# Patient Record
Sex: FEMALE | Race: BLACK OR AFRICAN AMERICAN | Employment: PART TIME | URBAN - METROPOLITAN AREA
[De-identification: names, ages, dates, MRNs, and addresses within clinical notes are randomized per-mention and may not be internally consistent; named-entity substitution may affect disease eponyms.]

---

## 2022-02-28 ENCOUNTER — HOSPITAL ENCOUNTER (OUTPATIENT)
Dept: ULTRASOUND IMAGING | Age: 22
Discharge: HOME | End: 2022-03-02
Payer: COMMERCIAL

## 2022-02-28 DIAGNOSIS — N93.8 DYSFUNCTIONAL UTERINE BLEEDING: ICD-10-CM

## 2022-02-28 PROCEDURE — 76856 US EXAM PELVIC COMPLETE: CPT

## 2022-08-09 ENCOUNTER — OFFICE VISIT (OUTPATIENT)
Dept: FAMILY MEDICINE CLINIC | Age: 22
End: 2022-08-09
Payer: COMMERCIAL

## 2022-08-09 VITALS
WEIGHT: 126.8 LBS | DIASTOLIC BLOOD PRESSURE: 66 MMHG | OXYGEN SATURATION: 100 % | HEIGHT: 69 IN | RESPIRATION RATE: 16 BRPM | BODY MASS INDEX: 18.78 KG/M2 | HEART RATE: 62 BPM | TEMPERATURE: 97.6 F | SYSTOLIC BLOOD PRESSURE: 104 MMHG

## 2022-08-09 DIAGNOSIS — J40 BRONCHITIS: Primary | ICD-10-CM

## 2022-08-09 LAB
Lab: NORMAL
PERFORMING INSTRUMENT: NORMAL
QC PASS/FAIL: NORMAL
SARS-COV-2, POC: NORMAL

## 2022-08-09 PROCEDURE — G8420 CALC BMI NORM PARAMETERS: HCPCS | Performed by: NURSE PRACTITIONER

## 2022-08-09 PROCEDURE — 1036F TOBACCO NON-USER: CPT | Performed by: NURSE PRACTITIONER

## 2022-08-09 PROCEDURE — 99202 OFFICE O/P NEW SF 15 MIN: CPT | Performed by: NURSE PRACTITIONER

## 2022-08-09 PROCEDURE — G8427 DOCREV CUR MEDS BY ELIG CLIN: HCPCS | Performed by: NURSE PRACTITIONER

## 2022-08-09 PROCEDURE — 87426 SARSCOV CORONAVIRUS AG IA: CPT | Performed by: NURSE PRACTITIONER

## 2022-08-09 RX ORDER — DROSPIRENONE AND ETHINYL ESTRADIOL 0.03MG-3MG
KIT ORAL
COMMUNITY
Start: 2022-06-20

## 2022-08-09 RX ORDER — PREDNISONE 20 MG/1
40 TABLET ORAL DAILY
Qty: 10 TABLET | Refills: 0 | Status: SHIPPED | OUTPATIENT
Start: 2022-08-09 | End: 2022-08-14

## 2022-08-09 RX ORDER — BROMPHENIRAMINE MALEATE, PSEUDOEPHEDRINE HYDROCHLORIDE, AND DEXTROMETHORPHAN HYDROBROMIDE 2; 30; 10 MG/5ML; MG/5ML; MG/5ML
5 SYRUP ORAL 4 TIMES DAILY PRN
Qty: 180 ML | Refills: 0 | Status: SHIPPED | OUTPATIENT
Start: 2022-08-09

## 2022-08-09 RX ORDER — AZITHROMYCIN 250 MG/1
TABLET, FILM COATED ORAL
Qty: 6 TABLET | Refills: 0 | Status: SHIPPED | OUTPATIENT
Start: 2022-08-09 | End: 2022-08-19

## 2022-08-09 SDOH — ECONOMIC STABILITY: FOOD INSECURITY: WITHIN THE PAST 12 MONTHS, YOU WORRIED THAT YOUR FOOD WOULD RUN OUT BEFORE YOU GOT MONEY TO BUY MORE.: NEVER TRUE

## 2022-08-09 SDOH — ECONOMIC STABILITY: TRANSPORTATION INSECURITY
IN THE PAST 12 MONTHS, HAS THE LACK OF TRANSPORTATION KEPT YOU FROM MEDICAL APPOINTMENTS OR FROM GETTING MEDICATIONS?: NO

## 2022-08-09 SDOH — ECONOMIC STABILITY: TRANSPORTATION INSECURITY
IN THE PAST 12 MONTHS, HAS LACK OF TRANSPORTATION KEPT YOU FROM MEETINGS, WORK, OR FROM GETTING THINGS NEEDED FOR DAILY LIVING?: NO

## 2022-08-09 SDOH — ECONOMIC STABILITY: FOOD INSECURITY: WITHIN THE PAST 12 MONTHS, THE FOOD YOU BOUGHT JUST DIDN'T LAST AND YOU DIDN'T HAVE MONEY TO GET MORE.: NEVER TRUE

## 2022-08-09 ASSESSMENT — PATIENT HEALTH QUESTIONNAIRE - PHQ9
SUM OF ALL RESPONSES TO PHQ QUESTIONS 1-9: 1
2. FEELING DOWN, DEPRESSED OR HOPELESS: 0
SUM OF ALL RESPONSES TO PHQ9 QUESTIONS 1 & 2: 1
SUM OF ALL RESPONSES TO PHQ QUESTIONS 1-9: 1
1. LITTLE INTEREST OR PLEASURE IN DOING THINGS: 1
SUM OF ALL RESPONSES TO PHQ QUESTIONS 1-9: 1
SUM OF ALL RESPONSES TO PHQ QUESTIONS 1-9: 1

## 2022-08-09 ASSESSMENT — ENCOUNTER SYMPTOMS
NAUSEA: 0
RHINORRHEA: 1
SINUS PRESSURE: 0
HEMOPTYSIS: 0
DIARRHEA: 0
TROUBLE SWALLOWING: 0
WHEEZING: 0
CONSTIPATION: 0
ABDOMINAL DISTENTION: 0
VOMITING: 0
COLOR CHANGE: 0
COUGH: 1
SINUS PAIN: 0
BACK PAIN: 0
CHEST TIGHTNESS: 1
ABDOMINAL PAIN: 0
HEARTBURN: 0
SHORTNESS OF BREATH: 0
SORE THROAT: 0

## 2022-08-09 ASSESSMENT — SOCIAL DETERMINANTS OF HEALTH (SDOH): HOW HARD IS IT FOR YOU TO PAY FOR THE VERY BASICS LIKE FOOD, HOUSING, MEDICAL CARE, AND HEATING?: NOT HARD AT ALL

## 2022-08-09 NOTE — PROGRESS NOTES
[id number] [address] [place]      [address] [facility]       Chief Complaint   Patient presents with    Cough     Congestion, body aches, chills and night sweats, when she coughs her ribs are hurting due to coughing so much. Started Saturday       Nurses Notes reviewed and I agree except as noted in the HPI. HISTORY OF PRESENT ILLNESS   Opal Romero is a 22 y.o. female who presents   Cough  This is a new problem. The current episode started in the past 7 days. The problem has been gradually worsening. The cough is Non-productive. Associated symptoms include chest pain (ribs with coughing), nasal congestion, postnasal drip and rhinorrhea. Pertinent negatives include no chills, ear congestion, ear pain, fever, headaches, heartburn, hemoptysis, myalgias, rash, sore throat, shortness of breath, sweats, weight loss or wheezing. Nothing aggravates the symptoms. She has tried OTC cough suppressant for the symptoms. The treatment provided no relief. Her past medical history is significant for asthma. There is no history of bronchiectasis, bronchitis, COPD, emphysema, environmental allergies or pneumonia. REVIEW OF SYSTEMS     Review of Systems   Constitutional:  Negative for activity change, appetite change, chills, diaphoresis, fatigue, fever and weight loss. HENT:  Positive for postnasal drip and rhinorrhea. Negative for congestion, ear pain, sinus pressure, sinus pain, sore throat and trouble swallowing. Eyes:  Negative for visual disturbance. Respiratory:  Positive for cough and chest tightness. Negative for hemoptysis, shortness of breath and wheezing. Cardiovascular:  Positive for chest pain (ribs with coughing). Negative for palpitations. Gastrointestinal:  Negative for abdominal distention, abdominal pain, constipation, diarrhea, heartburn, nausea and vomiting. Genitourinary:  Negative for difficulty urinating, dysuria, flank pain, frequency and urgency. Left eye: No discharge. Conjunctiva/sclera: Conjunctivae normal.      Pupils: Pupils are equal, round, and reactive to light. Cardiovascular:      Rate and Rhythm: Normal rate and regular rhythm. Pulses: Normal pulses. Pulmonary:      Effort: Pulmonary effort is normal.      Breath sounds: Examination of the right-lower field reveals decreased breath sounds. Examination of the left-lower field reveals decreased breath sounds. Decreased breath sounds present. No wheezing or rhonchi. Comments: Noted faint wheezing with coughing  Musculoskeletal:         General: No tenderness. Normal range of motion. Cervical back: Normal range of motion and neck supple. No rigidity or tenderness. Skin:     General: Skin is warm and dry. Neurological:      General: No focal deficit present. Mental Status: She is alert and oriented to person, place, and time. Mental status is at baseline. Cranial Nerves: No cranial nerve deficit. Sensory: No sensory deficit. Motor: No weakness. Coordination: Coordination normal.       DIAGNOSTICRESULTS   Labs:   Covid - neg    IMAGING:    URGENT CARE COURSE:     Vitals:    08/09/22 1051   BP: 104/66   Site: Right Upper Arm   Position: Sitting   Cuff Size: Medium Adult   Pulse: 62   Resp: 16   Temp: 97.6 °F (36.4 °C)   TempSrc: Temporal   SpO2: 100%   Weight: 126 lb 12.8 oz (57.5 kg)   Height: 5' 9\" (1.753 m)         PROCEDURES:  None  FINAL IMPRESSION      1. Bronchitis        DISPOSITION/PLAN   DISPOSITION      PATIENT REFERRED TO:  Return in about 1 week (around 8/16/2022), or if symptoms worsen or fail to improve, for follow up with PCP.   DISCHARGE MEDICATIONS:  New Prescriptions    AZITHROMYCIN (ZITHROMAX) 250 MG TABLET    Take 2 tabs DAY 1 then 1 tab DAYS 2-5    BROMPHENIRAMINE-PSEUDOEPHEDRINE-DM 2-30-10 MG/5ML SYRUP    Take 5 mLs by mouth 4 times daily as needed for Congestion or Cough    PREDNISONE (DELTASONE) 20 MG TABLET    Take 2 tablets by mouth in the morning for 5 days. Cannot display discharge medications since this is not an admission.       Ty Benitez, APRN - CNP

## 2022-08-09 NOTE — LETTER
[facility]  [address] [id number]  Phone: [phone]  Fax: 2021 MERCED Monzon CNP        August 9, 2022     Patient: Fabi Barraza   YOB: 2000   Date of Visit: 8/9/2022       To Whom it May Concern:    Fabi Barraza was seen in my clinic on 8/9/2022. She may return to school on 8/10/22, covid-19 negative on 8/9/22. If you have any questions or concerns, please don't hesitate to call.     Sincerely,         MERCED Guzman - CNP

## 2023-08-30 LAB
CHLAMYDIA TRACH., AMPLIFIED: NEGATIVE
CLUE CELLS: ABNORMAL
N. GONORRHEA, AMPLIFIED: NEGATIVE
NUGENT SCORE: 0
TRICHOMONAS VAGINALIS: NEGATIVE
YEAST: PRESENT

## 2023-10-09 PROBLEM — N89.8 VAGINAL IRRITATION: Status: ACTIVE | Noted: 2023-10-09

## 2023-10-09 PROBLEM — N89.8 VAGINAL DISCHARGE: Status: ACTIVE | Noted: 2023-10-09

## 2023-10-09 PROBLEM — R87.610 ATYPICAL SQUAMOUS CELLS OF UNDETERMINED SIGNIFICANCE (ASCUS) ON PAPANICOLAOU SMEAR OF CERVIX: Status: ACTIVE | Noted: 2023-10-09

## 2023-10-09 PROBLEM — R82.90 ABNORMAL URINE ODOR: Status: ACTIVE | Noted: 2023-10-09

## 2023-10-09 PROBLEM — N89.8 VAGINAL ODOR: Status: ACTIVE | Noted: 2023-10-09

## 2023-10-09 RX ORDER — TERCONAZOLE 8 MG/G
1 CREAM VAGINAL NIGHTLY
COMMUNITY

## 2023-10-09 RX ORDER — DROSPIRENONE AND ETHINYL ESTRADIOL 0.02-3(28)
1 KIT ORAL DAILY
COMMUNITY
End: 2023-10-11 | Stop reason: ALTCHOICE

## 2023-10-09 RX ORDER — ESTRADIOL 1 MG/1
1 TABLET ORAL DAILY
COMMUNITY

## 2023-10-11 ENCOUNTER — OFFICE VISIT (OUTPATIENT)
Dept: OBSTETRICS AND GYNECOLOGY | Facility: CLINIC | Age: 23
End: 2023-10-11
Payer: COMMERCIAL

## 2023-10-11 VITALS — SYSTOLIC BLOOD PRESSURE: 110 MMHG | WEIGHT: 130.2 LBS | DIASTOLIC BLOOD PRESSURE: 72 MMHG | BODY MASS INDEX: 19.23 KG/M2

## 2023-10-11 DIAGNOSIS — N92.1 BREAKTHROUGH BLEEDING ON NEXPLANON: Primary | ICD-10-CM

## 2023-10-11 DIAGNOSIS — Z11.3 SCREENING FOR STD (SEXUALLY TRANSMITTED DISEASE): ICD-10-CM

## 2023-10-11 DIAGNOSIS — Z97.5 BREAKTHROUGH BLEEDING ON NEXPLANON: Primary | ICD-10-CM

## 2023-10-11 PROCEDURE — 99212 OFFICE O/P EST SF 10 MIN: CPT | Performed by: ADVANCED PRACTICE MIDWIFE

## 2023-10-11 PROCEDURE — 87800 DETECT AGNT MULT DNA DIREC: CPT

## 2023-10-11 RX ORDER — DROSPIRENONE AND ETHINYL ESTRADIOL 0.03MG-3MG
1 KIT ORAL DAILY
COMMUNITY
Start: 2023-05-28

## 2023-10-11 RX ORDER — NORETHINDRONE AND ETHINYL ESTRADIOL 1 MG-35MCG
KIT ORAL
COMMUNITY
Start: 2023-07-08

## 2023-10-11 NOTE — PROGRESS NOTES
GYN PROGRESS NOTE          CC:   Patient used estrogen add back and her bleeding decreased significantly. She has had 2 depo provera injections through family planning and still had bleeding. Used oral estrogen add back and it helped decrease the bleeding. She stopped that Rx and then started OCPS about 3 days ago and the bleeding has decreased again. Patient is to receive her 3rd depo injection in November. Patient wants to receive her next depo in November and will switch to pills only if bleeding does not stop. Patients wants STI testing.  Chief Complaint   Patient presents with    Contraception     Est pt for bleeding. States started taking the estrogen pills since last visit. Started bleeding again then started taking the birth control pills. Bleeding has slowed down.         HPI:  Jose Harmon is here with bleeding still on depo.      ROS:  GYN - see HPI        PHYSICAL EXAM:  /72   Wt 59.1 kg (130 lb 3.2 oz)   BMI 19.23 kg/m²   GEN:  A&O, NAD  HEENT:  head HC/AT, no visible goiter  PSYCH:  normal affect, non-anxious      IMPRESSION/PLAN:  A; bleeding on depo. Possible STI exposure  Plan: 1. Patient to receive her next depo in November. 2. STI cultures sent off the urine.     Problem List Items Addressed This Visit    None  Visit Diagnoses       Screening for STD (sexually transmitted disease)        Relevant Orders    C. Trachomatis / N. Gonorrhoeae, Amplified Detection    TRICH VAGINALIS, AMPLIFIED                   RANDI Chang-DAVID

## 2023-10-12 LAB
C TRACH RRNA SPEC QL NAA+PROBE: NEGATIVE
N GONORRHOEA DNA SPEC QL PROBE+SIG AMP: NEGATIVE

## 2023-11-29 ENCOUNTER — OFFICE VISIT (OUTPATIENT)
Dept: OBSTETRICS AND GYNECOLOGY | Facility: CLINIC | Age: 23
End: 2023-11-29
Payer: COMMERCIAL

## 2023-11-29 VITALS — SYSTOLIC BLOOD PRESSURE: 90 MMHG | WEIGHT: 130 LBS | DIASTOLIC BLOOD PRESSURE: 60 MMHG | BODY MASS INDEX: 19.2 KG/M2

## 2023-11-29 DIAGNOSIS — Z11.3 SCREENING FOR STD (SEXUALLY TRANSMITTED DISEASE): ICD-10-CM

## 2023-11-29 DIAGNOSIS — Z30.41 ENCOUNTER FOR SURVEILLANCE OF CONTRACEPTIVE PILLS: Primary | ICD-10-CM

## 2023-11-29 PROCEDURE — 99212 OFFICE O/P EST SF 10 MIN: CPT | Performed by: ADVANCED PRACTICE MIDWIFE

## 2023-11-29 PROCEDURE — 87800 DETECT AGNT MULT DNA DIREC: CPT

## 2023-11-29 PROCEDURE — 87661 TRICHOMONAS VAGINALIS AMPLIF: CPT

## 2023-11-29 NOTE — PROGRESS NOTES
GYN PROGRESS NOTE          CC:   see below. Patient did not receive her next depo and is only taking OCPs now. Missed 2 days of OCPS and then took 3 pills at a time and had some GI issues. She also had brown spotting on the 3rd day that she took all the missed pills.  Patient wanting to just use OCPS for birth control. Patient is also concerned about STI. Had unprotective sex 4 weeks ago. Requests STI testing off the urine to be ran.   Chief Complaint   Patient presents with    Contraception     Est pt for bleeding. Missed two days of BC pills. Had some brown discharge and cramping like she was going to start period. Took the pills she missed when she found them. Now has not had the brown discharge, no cramping. Did not get her depo shot. Has just been taking the birth control pills.        HPI:  Jose Harmon scheduled visit today to discuss birth control.      Current birth control is OCPs, previous birth control use is depo provera.     ROS:  GYN - see HPI      PHYSICAL EXAM:  BP 90/60   Wt 59 kg (130 lb)   BMI 19.20 kg/m²   GEN:  A&O, NAD  DERM:  no acne or hirsutism  PSYCH:  normal affect, non-anxious      IMPRESSION/PLAN:    A: spotting after missing 2 days of pills. Possible STI exposure  Plan: 1. STI sent off the urine. 2. Continue to take OCPS and will call in Jan 2024 for refills.     Problem List Items Addressed This Visit    None  Visit Diagnoses       Screening for STD (sexually transmitted disease)        Relevant Orders    C. Trachomatis / N. Gonorrhoeae, Amplified Detection    Trichomonas vaginalis, Nucleic Acid Detection             Birth control counseling:  Counseling done on all birth control options, use/AE of hormonal birth control reviewed.  Recommend LARCs or Depo-Provera as first-line birth control, discouraged OCPs or barrier-only methods of birth control.  Will proceed with OCPs for contraception for now per patient request, use/AE reviewed.  Long-term birth control plan of a  LARC encouraged.  Also offered sterilization if no plans for future childbearing.  Condom use 100% encouraged for second line birth control and for STD prevention.        RANDI Chang-GIDEONM

## 2023-11-30 DIAGNOSIS — A74.9 CHLAMYDIA INFECTION: Primary | ICD-10-CM

## 2023-11-30 LAB
C TRACH RRNA SPEC QL NAA+PROBE: POSITIVE
N GONORRHOEA DNA SPEC QL PROBE+SIG AMP: NEGATIVE
T VAGINALIS RRNA SPEC QL NAA+PROBE: NEGATIVE

## 2023-11-30 RX ORDER — AZITHROMYCIN 500 MG/1
1000 TABLET, FILM COATED ORAL ONCE
Qty: 2 TABLET | Refills: 1 | Status: SHIPPED | OUTPATIENT
Start: 2023-11-30 | End: 2023-11-30

## 2023-12-01 ENCOUNTER — TELEPHONE (OUTPATIENT)
Dept: OBSTETRICS AND GYNECOLOGY | Facility: CLINIC | Age: 23
End: 2023-12-01
Payer: COMMERCIAL

## 2023-12-01 NOTE — TELEPHONE ENCOUNTER
Spoke with the pt and let her know.   ----- Message from Charu Gant MA sent at 12/1/2023  1:24 PM EST -----  Lvm   ----- Message -----  From: Charu Gant MA  Sent: 12/1/2023   8:14 AM EST  To: [facility] Clinical Support Staff    Went right to . Unable to leave message.   ----- Message -----  From: VERONIQUE Chang  Sent: 11/30/2023   4:37 PM EST  To: [facility] Clinical Support Staff    +chlamydia  noted on culture. rx sent in for her and refill for her partner    thanks    ----- Message -----  From: Lab, Background User  Sent: 11/30/2023  12:33 PM EST  To: VERONIQUE Chang

## 2023-12-05 ENCOUNTER — APPOINTMENT (OUTPATIENT)
Dept: OBSTETRICS AND GYNECOLOGY | Facility: CLINIC | Age: 23
End: 2023-12-05
Payer: COMMERCIAL

## 2024-01-24 ENCOUNTER — LAB (OUTPATIENT)
Dept: LAB | Facility: LAB | Age: 24
End: 2024-01-24
Payer: COMMERCIAL

## 2024-01-24 ENCOUNTER — OFFICE VISIT (OUTPATIENT)
Dept: OBSTETRICS AND GYNECOLOGY | Facility: CLINIC | Age: 24
End: 2024-01-24
Payer: COMMERCIAL

## 2024-01-24 VITALS
SYSTOLIC BLOOD PRESSURE: 108 MMHG | BODY MASS INDEX: 18.72 KG/M2 | WEIGHT: 126.4 LBS | DIASTOLIC BLOOD PRESSURE: 70 MMHG | HEIGHT: 69 IN

## 2024-01-24 DIAGNOSIS — R87.610 ASCUS WITH POSITIVE HIGH RISK HPV CERVICAL: ICD-10-CM

## 2024-01-24 DIAGNOSIS — N93.9 ABNORMAL UTERINE BLEEDING (AUB): ICD-10-CM

## 2024-01-24 DIAGNOSIS — Z01.419 NORMAL GYNECOLOGIC EXAMINATION: Primary | ICD-10-CM

## 2024-01-24 DIAGNOSIS — Z30.41 ORAL CONTRACEPTIVE PILL SURVEILLANCE: ICD-10-CM

## 2024-01-24 DIAGNOSIS — A56.09 CHLAMYDIAL CERVICITIS: ICD-10-CM

## 2024-01-24 DIAGNOSIS — Z12.4 SCREENING FOR CERVICAL CANCER: ICD-10-CM

## 2024-01-24 DIAGNOSIS — Z20.2 STD EXPOSURE: ICD-10-CM

## 2024-01-24 DIAGNOSIS — R87.810 ASCUS WITH POSITIVE HIGH RISK HPV CERVICAL: ICD-10-CM

## 2024-01-24 LAB
B-HCG SERPL-ACNC: <2 MIU/ML
HIV 1+2 AB+HIV1 P24 AG SERPL QL IA: NONREACTIVE
TSH SERPL-ACNC: 0.75 MIU/L (ref 0.44–3.98)

## 2024-01-24 PROCEDURE — 87661 TRICHOMONAS VAGINALIS AMPLIF: CPT

## 2024-01-24 PROCEDURE — 1036F TOBACCO NON-USER: CPT | Performed by: OBSTETRICS & GYNECOLOGY

## 2024-01-24 PROCEDURE — 84702 CHORIONIC GONADOTROPIN TEST: CPT

## 2024-01-24 PROCEDURE — 86780 TREPONEMA PALLIDUM: CPT

## 2024-01-24 PROCEDURE — 36415 COLL VENOUS BLD VENIPUNCTURE: CPT

## 2024-01-24 PROCEDURE — 87340 HEPATITIS B SURFACE AG IA: CPT

## 2024-01-24 PROCEDURE — 87800 DETECT AGNT MULT DNA DIREC: CPT

## 2024-01-24 PROCEDURE — 87389 HIV-1 AG W/HIV-1&-2 AB AG IA: CPT

## 2024-01-24 PROCEDURE — 99395 PREV VISIT EST AGE 18-39: CPT | Performed by: OBSTETRICS & GYNECOLOGY

## 2024-01-24 PROCEDURE — 84443 ASSAY THYROID STIM HORMONE: CPT

## 2024-01-24 PROCEDURE — 86803 HEPATITIS C AB TEST: CPT

## 2024-01-24 PROCEDURE — 88175 CYTOPATH C/V AUTO FLUID REDO: CPT

## 2024-01-24 PROCEDURE — 88141 CYTOPATH C/V INTERPRET: CPT | Performed by: PATHOLOGY

## 2024-01-24 NOTE — PROGRESS NOTES
"GYNECOLOGY PROGRESS NOTE      CC:    Chief Complaint   Patient presents with    Annual Exam     Est patient - annual exam with pap - patient has aub since Nov.  Last pap 1/2023 ASCUS w/HPV pos  Chaperone harsh laguna ma       HPI:  Jose Harmon is here for a routine GYN examination.      Patient has GYN complaints of AUB.  She is not having off-and-on bleeding since November.  She saw Susan Montenegro for this in November had missed a couple of pills which was thought to be the etiology, patient was diagnosed with chlamydia in December, she was treated, her partner who she is no longer with also was recently notified and informed that he needed treated.   No pregnancy signs or symptoms.        Contraception:  OCPs (Joanna)  Pregnancy plans:  no  Gardasil:  yes?      ROS:  GI - no blood in BMs  URO - no hematuria  GYN - no AUB or vaginal discharge  PSYCH - mood OK        PHYSICAL EXAM:  /70 (BP Location: Left arm, Patient Position: Sitting)   Ht 1.753 m (5' 9\")   Wt 57.3 kg (126 lb 6.4 oz)   LMP 11/24/2023   BMI 18.67 kg/m²   GEN:  A&O, NAD  HEENT:  prominent thyroid visually but no palpable masses  ABD:  NT/ND, soft, no palpable masses  URO:  normal urethra, no bladder TTP  GYN:  normal vulva and perineum w/o lesions or ulcers, normal vagina without discharge or lesions, normal cervix without lesions or discharge or CMT, uterus NT/NE, adnexa mobile and NT/NE  BREAST:  no masses or TTP, no skin lesions or nipple discharge  DERM:  no hirsutism or acne   PSYCH:  normal affect, non-anxious      IMPRESSION/PLAN:  Problem List Items Addressed This Visit    None  Visit Diagnoses       Abnormal uterine bleeding (AUB)    -  Primary    Relevant Orders    TSH with reflex to Free T4 if abnormal    US PELVIS TRANSABDOMINAL WITH TRANSVAGINAL    Human Chorionic Gonadotropin, Serum Quantitative    STD exposure        Relevant Orders    C. Trachomatis / N. Gonorrhoeae, Amplified Detection    Hepatitis B Surface Antigen "    Hepatitis C Antibody    HIV 1/2 Antigen/Antibody Screen with Reflex to Confirmation    Syphilis Screen with Reflex    Trichomonas vaginalis, Nucleic Acid Detection    Chlamydial cervicitis        Relevant Orders    C. Trachomatis / N. Gonorrhoeae, Amplified Detection    Hepatitis B Surface Antigen    Hepatitis C Antibody    HIV 1/2 Antigen/Antibody Screen with Reflex to Confirmation    Syphilis Screen with Reflex    Trichomonas vaginalis, Nucleic Acid Detection             ASCUS pap/+ other HR HPV:  2023 pap (1st) abnormal.  ASCCP guidelines repeat cytology testing only done today.  No Hx of HGSIL, next due in 3 years    Chlamydia:  S/P.  Test of cure in full STD  panel testing done today.    AUB: Pelvic ultrasound and labs (TSH/'s serum hCG) ordered.  If workup is normal then will change to alternate (Joanna to Barb higher estrogen dose) birth control pills.    OCP Rx:  On Rx Joanna.  Having AUB.  Serum HCG ordered,.  OCP use/AE reviewed, no contraindications to use, VTE precautions reviewed.  Declines alternate BC such as LARCs.  Condom use PRN for STD prevention and backup birth control recommended particularly in light of recent STD diagnosis.      Tyrone Thakur, DO

## 2024-01-25 PROBLEM — Z30.41 ORAL CONTRACEPTIVE PILL SURVEILLANCE: Status: ACTIVE | Noted: 2024-01-25

## 2024-01-25 PROBLEM — R87.610 ASCUS WITH POSITIVE HIGH RISK HPV CERVICAL: Status: ACTIVE | Noted: 2024-01-25

## 2024-01-25 PROBLEM — N89.8 VAGINAL DISCHARGE: Status: RESOLVED | Noted: 2023-10-09 | Resolved: 2024-01-25

## 2024-01-25 PROBLEM — R82.90 ABNORMAL URINE ODOR: Status: RESOLVED | Noted: 2023-10-09 | Resolved: 2024-01-25

## 2024-01-25 PROBLEM — R87.810 ASCUS WITH POSITIVE HIGH RISK HPV CERVICAL: Status: ACTIVE | Noted: 2024-01-25

## 2024-01-25 PROBLEM — N89.8 VAGINAL ODOR: Status: RESOLVED | Noted: 2023-10-09 | Resolved: 2024-01-25

## 2024-01-25 PROBLEM — Z01.419 NORMAL GYNECOLOGIC EXAMINATION: Status: ACTIVE | Noted: 2024-01-25

## 2024-01-25 PROBLEM — R87.610 ATYPICAL SQUAMOUS CELLS OF UNDETERMINED SIGNIFICANCE (ASCUS) ON PAPANICOLAOU SMEAR OF CERVIX: Status: RESOLVED | Noted: 2023-10-09 | Resolved: 2024-01-25

## 2024-01-25 PROBLEM — N93.9 ABNORMAL UTERINE BLEEDING (AUB): Status: ACTIVE | Noted: 2024-01-25

## 2024-01-25 PROBLEM — N89.8 VAGINAL IRRITATION: Status: RESOLVED | Noted: 2023-10-09 | Resolved: 2024-01-25

## 2024-01-25 LAB
C TRACH RRNA SPEC QL NAA+PROBE: NEGATIVE
HBV SURFACE AG SERPL QL IA: NONREACTIVE
HCV AB SER QL: NONREACTIVE
N GONORRHOEA DNA SPEC QL PROBE+SIG AMP: NEGATIVE
T VAGINALIS RRNA SPEC QL NAA+PROBE: NEGATIVE
TREPONEMA PALLIDUM IGG+IGM AB [PRESENCE] IN SERUM OR PLASMA BY IMMUNOASSAY: NONREACTIVE

## 2024-01-30 ENCOUNTER — HOSPITAL ENCOUNTER (OUTPATIENT)
Dept: RADIOLOGY | Facility: HOSPITAL | Age: 24
Discharge: HOME | End: 2024-01-30
Payer: COMMERCIAL

## 2024-01-30 DIAGNOSIS — N93.9 ABNORMAL UTERINE BLEEDING (AUB): ICD-10-CM

## 2024-01-30 PROCEDURE — 76856 US EXAM PELVIC COMPLETE: CPT | Performed by: RADIOLOGY

## 2024-01-30 PROCEDURE — 76856 US EXAM PELVIC COMPLETE: CPT

## 2024-01-30 PROCEDURE — 76830 TRANSVAGINAL US NON-OB: CPT | Performed by: RADIOLOGY

## 2024-02-06 ENCOUNTER — TELEPHONE (OUTPATIENT)
Dept: OBSTETRICS AND GYNECOLOGY | Facility: CLINIC | Age: 24
End: 2024-02-06
Payer: COMMERCIAL

## 2024-02-06 DIAGNOSIS — Z30.011 ORAL CONTRACEPTION INITIAL PRESCRIPTION: ICD-10-CM

## 2024-02-06 DIAGNOSIS — N93.9 ABNORMAL UTERINE BLEEDING (AUB): ICD-10-CM

## 2024-02-06 NOTE — TELEPHONE ENCOUNTER
----- Message from Tyrone Thakur DO sent at 2/2/2024  7:35 AM EST -----  Call patient please:   normal labs and US, plan was to switch Joanna to higher dose estrogen Barb to help stop AUB.  If she's OK with that send Rx, change at end of current pill pack

## 2024-02-06 NOTE — TELEPHONE ENCOUNTER
Spoke with patient and made her aware of the results and recommendation.  Patient agreed with the recommendation - script for Barb sent to Dr. Thakur for signature.  Reviewed and approved by KATIUSKA STEWART on 2/6/24 at 9:13 AM.

## 2024-02-07 ENCOUNTER — TELEPHONE (OUTPATIENT)
Dept: OBSTETRICS AND GYNECOLOGY | Facility: CLINIC | Age: 24
End: 2024-02-07
Payer: COMMERCIAL

## 2024-02-07 LAB
CYTOLOGY CMNT CVX/VAG CYTO-IMP: NORMAL
LAB AP HPV GENOTYPE QUESTION: YES
LAB AP HPV HR: NORMAL
LABORATORY COMMENT REPORT: NORMAL
LMP START DATE: NORMAL
PATH REPORT.TOTAL CANCER: NORMAL

## 2024-02-07 RX ORDER — DESOGESTREL AND ETHINYL ESTRADIOL 0.15-0.03
1 KIT ORAL DAILY
Qty: 28 TABLET | Refills: 12 | Status: SHIPPED | OUTPATIENT
Start: 2024-02-07 | End: 2025-02-06

## 2024-02-07 NOTE — TELEPHONE ENCOUNTER
----- Message from Tyrone Thakur DO sent at 2/7/2024 12:56 PM EST -----  Call patient please.  Her Pap smear shows and very mild abnormality called LGSIL, however given her age the recommendation is to just repeat the pap in 1 year--if still abnormal pap in 1 year will need a COLPO.

## 2024-02-07 NOTE — TELEPHONE ENCOUNTER
Spoke with patient and made her aware of the result and recommendation.  Reviewed and approved by KATIUSKA STEWART on 2/7/24 at 2:28 PM.

## 2024-04-22 ENCOUNTER — TELEPHONE (OUTPATIENT)
Dept: OBSTETRICS AND GYNECOLOGY | Facility: CLINIC | Age: 24
End: 2024-04-22
Payer: COMMERCIAL

## 2024-04-22 DIAGNOSIS — N89.8 VAGINAL DISCHARGE: Primary | ICD-10-CM

## 2024-04-22 RX ORDER — METRONIDAZOLE 500 MG/1
500 TABLET ORAL 2 TIMES DAILY
Qty: 14 TABLET | Refills: 1 | Status: SHIPPED | OUTPATIENT
Start: 2024-04-22 | End: 2024-04-29

## 2024-04-22 RX ORDER — METRONIDAZOLE 500 MG/1
500 TABLET ORAL 2 TIMES DAILY
Qty: 14 TABLET | Refills: 0 | Status: CANCELLED | OUTPATIENT
Start: 2024-04-22 | End: 2024-04-29

## 2024-04-22 RX ORDER — METRONIDAZOLE 500 MG/1
500 TABLET ORAL 2 TIMES DAILY
COMMUNITY
Start: 2024-04-06 | End: 2024-04-13

## 2024-04-22 NOTE — TELEPHONE ENCOUNTER
Patient would like for Juaquin or her MA to give her a call. Patient would not give more information

## 2024-07-31 ENCOUNTER — OFFICE VISIT (OUTPATIENT)
Dept: OBSTETRICS AND GYNECOLOGY | Facility: CLINIC | Age: 24
End: 2024-07-31
Payer: COMMERCIAL

## 2024-07-31 VITALS — WEIGHT: 120.9 LBS | BODY MASS INDEX: 17.85 KG/M2 | DIASTOLIC BLOOD PRESSURE: 70 MMHG | SYSTOLIC BLOOD PRESSURE: 118 MMHG

## 2024-07-31 DIAGNOSIS — Z20.2 POSSIBLE EXPOSURE TO STD: Primary | ICD-10-CM

## 2024-07-31 PROCEDURE — 87205 SMEAR GRAM STAIN: CPT

## 2024-07-31 PROCEDURE — 87661 TRICHOMONAS VAGINALIS AMPLIF: CPT

## 2024-07-31 PROCEDURE — 87491 CHLMYD TRACH DNA AMP PROBE: CPT

## 2024-07-31 PROCEDURE — 87591 N.GONORRHOEAE DNA AMP PROB: CPT

## 2024-07-31 PROCEDURE — 99213 OFFICE O/P EST LOW 20 MIN: CPT | Performed by: ADVANCED PRACTICE MIDWIFE

## 2024-07-31 NOTE — PROGRESS NOTES
GYNECOLOGY PROGRESS NOTE        CC:  see below. Patient used an antibiotic recently but doesn't feel she has a yeast infection but would like a vaginal culture sent anyways. Denies any symptoms, no odor, no discharge, or no itching. Wants STI cultures but not blood work.   Chief Complaint   Patient presents with    Follow-up     Est pt visit.   Patient here for STD testing. No symptoms        HPI:  Jose Harmon is here for STI cultures and vaginal culture.   She denies any new sexual partners, or new soaps or detergents.  She did use antibiotics recently.  Prior STDs +trich, recurrent vaginitis Hx of BV too.      ROS:  GYN - see HPI        PHYSICAL EXAM:  /70 (BP Location: Left arm, Patient Position: Sitting)   Wt 54.8 kg (120 lb 14.4 oz)   BMI 17.85 kg/m²   GEN:  A&O, NAD  GYN:  normal vulva and perineum w/o lesions or ulcers,   PSYCH:  normal affect, non-anxious      IMPRESSION/PLAN:    A: possible STI exposure. No discharge. Recent antibiotic use  Plan: 1. STI cultures off the urine sent. 2. Vaginal cx obtained. 3. Treat if any cultures are positive.    Problem List Items Addressed This Visit    None      STD cultures done.  Vulvar hygiene measures and condom use for STD prevention reviewed.       VERONIQUE Chang

## 2024-08-01 DIAGNOSIS — B37.9 CANDIDIASIS: Primary | ICD-10-CM

## 2024-08-01 LAB
C TRACH RRNA SPEC QL NAA+PROBE: NEGATIVE
CLUE CELLS VAG LPF-#/AREA: ABNORMAL /[LPF]
N GONORRHOEA DNA SPEC QL PROBE+SIG AMP: NEGATIVE
NUGENT SCORE: 1
T VAGINALIS RRNA SPEC QL NAA+PROBE: NEGATIVE
YEAST VAG WET PREP-#/AREA: PRESENT

## 2024-08-01 RX ORDER — FLUCONAZOLE 150 MG/1
150 TABLET ORAL DAILY
Qty: 2 TABLET | Refills: 1 | Status: SHIPPED | OUTPATIENT
Start: 2024-08-01 | End: 2024-08-03

## 2024-09-04 ENCOUNTER — OFFICE VISIT (OUTPATIENT)
Dept: OBSTETRICS AND GYNECOLOGY | Facility: CLINIC | Age: 24
End: 2024-09-04
Payer: COMMERCIAL

## 2024-09-04 VITALS
BODY MASS INDEX: 18.47 KG/M2 | DIASTOLIC BLOOD PRESSURE: 70 MMHG | SYSTOLIC BLOOD PRESSURE: 102 MMHG | WEIGHT: 124.7 LBS | HEIGHT: 69 IN

## 2024-09-04 DIAGNOSIS — Z20.2 STD EXPOSURE: Primary | ICD-10-CM

## 2024-09-04 DIAGNOSIS — Z11.3 SCREENING FOR STDS (SEXUALLY TRANSMITTED DISEASES): ICD-10-CM

## 2024-09-04 PROCEDURE — 87109 MYCOPLASMA: CPT

## 2024-09-04 PROCEDURE — 3008F BODY MASS INDEX DOCD: CPT | Performed by: ADVANCED PRACTICE MIDWIFE

## 2024-09-04 PROCEDURE — 99213 OFFICE O/P EST LOW 20 MIN: CPT | Performed by: ADVANCED PRACTICE MIDWIFE

## 2024-09-04 PROCEDURE — 87491 CHLMYD TRACH DNA AMP PROBE: CPT

## 2024-09-04 PROCEDURE — 87563 M. GENITALIUM AMP PROBE: CPT

## 2024-09-04 PROCEDURE — 87798 DETECT AGENT NOS DNA AMP: CPT

## 2024-09-04 PROCEDURE — 87205 SMEAR GRAM STAIN: CPT

## 2024-09-04 PROCEDURE — 87591 N.GONORRHOEAE DNA AMP PROB: CPT

## 2024-09-04 PROCEDURE — 87661 TRICHOMONAS VAGINALIS AMPLIF: CPT

## 2024-09-04 NOTE — PROGRESS NOTES
"GYNECOLOGY PROGRESS NOTE        CC:  wants to make sure her previous infection is cleared  Chief Complaint   Patient presents with    Follow-up     Would like testing to verify that she no longer has bacterial vaginitis. She is not having any symptoms.  Would like testing for ureaplasma and mycoplasma.   Would like std testing.   No urinary issues.   Periods are lighter and shorter. Last period was 3 days long.         HPI:    23 y.o female presents for cultures s/p BV. Per pt completed antibiotics approx. 1 month ago. Wants testing for ureaplasma and mycoplasma.  Pt reports that she noticed her period shortened. Denies vaginal discharge and pain.    ROS:  GYN - see HPI        PHYSICAL EXAM:  /70   Ht 1.753 m (5' 9\")   Wt 56.6 kg (124 lb 11.2 oz)   LMP 08/02/2024   BMI 18.41 kg/m²   GEN:  A&O, NAD  URO:  normal urethra,   GYN:  normal vulva and perineum w/o lesions or ulcers, no lesions  PSYCH:  normal affect, non-anxious      IMPRESSION/PLAN:  A: normal GYN exam. Cultures collected. Possible STI exposure.  P: results pending, will treat as indicated. STI cultures. Mycoplasma cx sent.     Problem List Items Addressed This Visit    None  Visit Diagnoses       STD exposure    -  Primary    Relevant Orders    Vaginitis Gram Stain For Bacterial Vaginosis + Yeast    Ureaplasma/Mycoplasma Culture    Screening for STDs (sexually transmitted diseases)        Relevant Orders    C. trachomatis + N. gonorrhoeae, Amplified    Trichomonas vaginalis, Amplified        Results will be available in [facility]. The office will contact you if treatment is indicated.     Vulvar hygiene measures and condom use for STD prevention reviewed.       Verito Salinas RN    I saw and evaluated the patient. I personally obtained the key and critical portions of the history and physical exam or was physically present for key and critical portions performed by the student. I reviewed the student's documentation and discussed the patient " with the student. I agree with the student's medical decision making as documented in the note.      Susan Montenegro CNM

## 2024-09-05 LAB
BACTERIAL VAGINOSIS VAG-IMP: NORMAL
C TRACH RRNA SPEC QL NAA+PROBE: NEGATIVE
CLUE CELLS VAG LPF-#/AREA: NORMAL /[LPF]
N GONORRHOEA DNA SPEC QL PROBE+SIG AMP: NEGATIVE
NUGENT SCORE: 4
T VAGINALIS RRNA SPEC QL NAA+PROBE: NEGATIVE
YEAST VAG WET PREP-#/AREA: NORMAL

## 2024-09-12 LAB — SCAN RESULT: NORMAL

## 2024-12-06 ENCOUNTER — OFFICE VISIT (OUTPATIENT)
Dept: OBSTETRICS AND GYNECOLOGY | Facility: CLINIC | Age: 24
End: 2024-12-06
Payer: COMMERCIAL

## 2024-12-06 VITALS
BODY MASS INDEX: 18.99 KG/M2 | HEIGHT: 69 IN | DIASTOLIC BLOOD PRESSURE: 60 MMHG | SYSTOLIC BLOOD PRESSURE: 102 MMHG | WEIGHT: 128.2 LBS

## 2024-12-06 DIAGNOSIS — Z11.3 ROUTINE SCREENING FOR STI (SEXUALLY TRANSMITTED INFECTION): ICD-10-CM

## 2024-12-06 DIAGNOSIS — N89.8 VAGINAL DISCHARGE: ICD-10-CM

## 2024-12-06 DIAGNOSIS — N89.8 VAGINAL ITCHING: Primary | ICD-10-CM

## 2024-12-06 PROCEDURE — 87591 N.GONORRHOEAE DNA AMP PROB: CPT

## 2024-12-06 PROCEDURE — 99213 OFFICE O/P EST LOW 20 MIN: CPT | Performed by: MIDWIFE

## 2024-12-06 PROCEDURE — 3008F BODY MASS INDEX DOCD: CPT | Performed by: MIDWIFE

## 2024-12-06 PROCEDURE — 87491 CHLMYD TRACH DNA AMP PROBE: CPT

## 2024-12-06 PROCEDURE — 87205 SMEAR GRAM STAIN: CPT

## 2024-12-06 PROCEDURE — 87661 TRICHOMONAS VAGINALIS AMPLIF: CPT

## 2024-12-06 RX ORDER — FLUCONAZOLE 150 MG/1
150 TABLET ORAL ONCE
Qty: 1 TABLET | Refills: 0 | Status: SHIPPED | OUTPATIENT
Start: 2024-12-06 | End: 2024-12-06

## 2024-12-06 NOTE — PROGRESS NOTES
Subjective   Jose Harmon is a 23 y.o. female who presents for evaluation of an abnormal vaginal discharge. Symptoms have been present for a few days. Vaginal symptoms: discharge described as white and curd-like and vulvar itching. Contraception: none. She denies abnormal bleeding Also desires routine STI cultures.    Objective   Physical Exam  Vitals reviewed.   HENT:      Nose: Nose normal.   Eyes:      Pupils: Pupils are equal, round, and reactive to light.   Pulmonary:      Effort: Pulmonary effort is normal.   Genitourinary:     Vagina: Vaginal discharge present.      Comments: Thick, clumpy discharge noted throughout os  Musculoskeletal:         General: Normal range of motion.   Skin:     General: Skin is warm and dry.      Capillary Refill: Capillary refill takes less than 2 seconds.   Neurological:      General: No focal deficit present.      Mental Status: She is alert and oriented to person, place, and time.   Psychiatric:         Mood and Affect: Mood normal.         Behavior: Behavior normal.         Thought Content: Thought content normal.         Judgment: Judgment normal.          Assessment/Plan   A: Routine STI testing      Vaginal discharge with itching    P: Reviewed BP, weight      Urine collected      Vaginitis      Treated presumptively with diflucan      RTO for annual and sooner PRN

## 2024-12-07 LAB
C TRACH RRNA SPEC QL NAA+PROBE: NEGATIVE
CLUE CELLS VAG LPF-#/AREA: NORMAL /[LPF]
N GONORRHOEA DNA SPEC QL PROBE+SIG AMP: NEGATIVE
NUGENT SCORE: 1
T VAGINALIS RRNA SPEC QL NAA+PROBE: NEGATIVE
YEAST VAG WET PREP-#/AREA: NORMAL

## 2024-12-24 ENCOUNTER — APPOINTMENT (OUTPATIENT)
Dept: RADIOLOGY | Facility: HOSPITAL | Age: 24
End: 2024-12-24
Payer: COMMERCIAL

## 2024-12-24 ENCOUNTER — HOSPITAL ENCOUNTER (EMERGENCY)
Facility: HOSPITAL | Age: 24
Discharge: HOME | End: 2024-12-24
Payer: COMMERCIAL

## 2024-12-24 VITALS
DIASTOLIC BLOOD PRESSURE: 67 MMHG | OXYGEN SATURATION: 100 % | HEART RATE: 77 BPM | HEIGHT: 69 IN | WEIGHT: 127 LBS | RESPIRATION RATE: 17 BRPM | BODY MASS INDEX: 18.81 KG/M2 | SYSTOLIC BLOOD PRESSURE: 110 MMHG | TEMPERATURE: 97 F

## 2024-12-24 DIAGNOSIS — S90.812A ABRASION OF LEFT FOOT, INITIAL ENCOUNTER: Primary | ICD-10-CM

## 2024-12-24 LAB
ALBUMIN SERPL BCP-MCNC: 4.8 G/DL (ref 3.4–5)
ALP SERPL-CCNC: 66 U/L (ref 33–110)
ALT SERPL W P-5'-P-CCNC: 16 U/L (ref 7–45)
ANION GAP SERPL CALC-SCNC: 9 MMOL/L (ref 10–20)
AST SERPL W P-5'-P-CCNC: 26 U/L (ref 9–39)
BASOPHILS # BLD AUTO: 0.02 X10*3/UL (ref 0–0.1)
BASOPHILS NFR BLD AUTO: 0.2 %
BILIRUB SERPL-MCNC: 0.5 MG/DL (ref 0–1.2)
BUN SERPL-MCNC: 12 MG/DL (ref 6–23)
CALCIUM SERPL-MCNC: 9.8 MG/DL (ref 8.6–10.3)
CHLORIDE SERPL-SCNC: 107 MMOL/L (ref 98–107)
CO2 SERPL-SCNC: 29 MMOL/L (ref 21–32)
CREAT SERPL-MCNC: 1.02 MG/DL (ref 0.5–1.05)
EGFRCR SERPLBLD CKD-EPI 2021: 80 ML/MIN/1.73M*2
EOSINOPHIL # BLD AUTO: 0.08 X10*3/UL (ref 0–0.7)
EOSINOPHIL NFR BLD AUTO: 0.9 %
ERYTHROCYTE [DISTWIDTH] IN BLOOD BY AUTOMATED COUNT: 13.2 % (ref 11.5–14.5)
GLUCOSE BLD MANUAL STRIP-MCNC: 122 MG/DL (ref 74–99)
GLUCOSE SERPL-MCNC: 53 MG/DL (ref 74–99)
HCT VFR BLD AUTO: 42.6 % (ref 36–46)
HGB BLD-MCNC: 13.6 G/DL (ref 12–16)
IMM GRANULOCYTES # BLD AUTO: 0.03 X10*3/UL (ref 0–0.7)
IMM GRANULOCYTES NFR BLD AUTO: 0.3 % (ref 0–0.9)
LYMPHOCYTES # BLD AUTO: 1.58 X10*3/UL (ref 1.2–4.8)
LYMPHOCYTES NFR BLD AUTO: 17.8 %
MCH RBC QN AUTO: 29.8 PG (ref 26–34)
MCHC RBC AUTO-ENTMCNC: 31.9 G/DL (ref 32–36)
MCV RBC AUTO: 93 FL (ref 80–100)
MONOCYTES # BLD AUTO: 0.34 X10*3/UL (ref 0.1–1)
MONOCYTES NFR BLD AUTO: 3.8 %
NEUTROPHILS # BLD AUTO: 6.83 X10*3/UL (ref 1.2–7.7)
NEUTROPHILS NFR BLD AUTO: 77 %
NRBC BLD-RTO: 0 /100 WBCS (ref 0–0)
PLATELET # BLD AUTO: 221 X10*3/UL (ref 150–450)
POTASSIUM SERPL-SCNC: 4.2 MMOL/L (ref 3.5–5.3)
PROT SERPL-MCNC: 7.7 G/DL (ref 6.4–8.2)
RBC # BLD AUTO: 4.56 X10*6/UL (ref 4–5.2)
SODIUM SERPL-SCNC: 141 MMOL/L (ref 136–145)
WBC # BLD AUTO: 8.9 X10*3/UL (ref 4.4–11.3)

## 2024-12-24 PROCEDURE — 85025 COMPLETE CBC W/AUTO DIFF WBC: CPT | Performed by: REGISTERED NURSE

## 2024-12-24 PROCEDURE — 2500000001 HC RX 250 WO HCPCS SELF ADMINISTERED DRUGS (ALT 637 FOR MEDICARE OP): Performed by: REGISTERED NURSE

## 2024-12-24 PROCEDURE — 99284 EMERGENCY DEPT VISIT MOD MDM: CPT

## 2024-12-24 PROCEDURE — 82947 ASSAY GLUCOSE BLOOD QUANT: CPT

## 2024-12-24 PROCEDURE — 36415 COLL VENOUS BLD VENIPUNCTURE: CPT | Performed by: REGISTERED NURSE

## 2024-12-24 PROCEDURE — 73630 X-RAY EXAM OF FOOT: CPT | Mod: LT

## 2024-12-24 PROCEDURE — 73630 X-RAY EXAM OF FOOT: CPT | Mod: LEFT SIDE | Performed by: RADIOLOGY

## 2024-12-24 PROCEDURE — 80053 COMPREHEN METABOLIC PANEL: CPT | Performed by: REGISTERED NURSE

## 2024-12-24 RX ORDER — IBUPROFEN 400 MG/1
800 TABLET ORAL ONCE
Status: COMPLETED | OUTPATIENT
Start: 2024-12-24 | End: 2024-12-24

## 2024-12-24 RX ORDER — CEPHALEXIN 500 MG/1
500 CAPSULE ORAL 4 TIMES DAILY
Qty: 28 CAPSULE | Refills: 0 | Status: SHIPPED | OUTPATIENT
Start: 2024-12-24 | End: 2024-12-31

## 2024-12-24 RX ORDER — IBUPROFEN 600 MG/1
600 TABLET ORAL EVERY 6 HOURS PRN
Qty: 20 TABLET | Refills: 0 | Status: SHIPPED | OUTPATIENT
Start: 2024-12-24 | End: 2024-12-24

## 2024-12-24 RX ORDER — CEPHALEXIN 500 MG/1
500 CAPSULE ORAL 4 TIMES DAILY
Qty: 28 CAPSULE | Refills: 0 | Status: SHIPPED | OUTPATIENT
Start: 2024-12-24 | End: 2024-12-24

## 2024-12-24 RX ORDER — IBUPROFEN 600 MG/1
600 TABLET ORAL EVERY 6 HOURS PRN
Qty: 20 TABLET | Refills: 0 | Status: SHIPPED | OUTPATIENT
Start: 2024-12-24

## 2024-12-24 ASSESSMENT — LIFESTYLE VARIABLES
HAVE YOU EVER FELT YOU SHOULD CUT DOWN ON YOUR DRINKING: NO
TOTAL SCORE: 0
EVER FELT BAD OR GUILTY ABOUT YOUR DRINKING: NO
EVER HAD A DRINK FIRST THING IN THE MORNING TO STEADY YOUR NERVES TO GET RID OF A HANGOVER: NO
HAVE PEOPLE ANNOYED YOU BY CRITICIZING YOUR DRINKING: NO

## 2024-12-24 ASSESSMENT — COLUMBIA-SUICIDE SEVERITY RATING SCALE - C-SSRS
2. HAVE YOU ACTUALLY HAD ANY THOUGHTS OF KILLING YOURSELF?: NO
1. IN THE PAST MONTH, HAVE YOU WISHED YOU WERE DEAD OR WISHED YOU COULD GO TO SLEEP AND NOT WAKE UP?: NO
6. HAVE YOU EVER DONE ANYTHING, STARTED TO DO ANYTHING, OR PREPARED TO DO ANYTHING TO END YOUR LIFE?: NO

## 2024-12-24 ASSESSMENT — PAIN SCALES - GENERAL: PAINLEVEL_OUTOF10: 9

## 2024-12-24 ASSESSMENT — PAIN - FUNCTIONAL ASSESSMENT: PAIN_FUNCTIONAL_ASSESSMENT: 0-10

## 2024-12-24 ASSESSMENT — PAIN DESCRIPTION - LOCATION: LOCATION: FOOT

## 2024-12-24 NOTE — ED PROVIDER NOTES
HPI   Chief Complaint   Patient presents with    Wound Check     23-year-old female presents emergency department today for evaluation of wound to medial aspect of left foot.  Patient tells me she was in her room on Saturday and did the splits.  She tells me she sustained a wound to the left foot.  This started, red and painful.  Patient denies any fever or chills.  Patient recently pain scale.  Patient tells me that she has used muscle relaxers for pain with no relief.      History provided by:  Patient   used: No            Patient History   Past Medical History:   Diagnosis Date    Chlamydia 12/2023     Past Surgical History:   Procedure Laterality Date    NO PAST SURGERIES       No family history on file.  Social History     Tobacco Use    Smoking status: Never    Smokeless tobacco: Never   Substance Use Topics    Alcohol use: Not on file    Drug use: Not on file       Physical Exam   ED Triage Vitals [12/24/24 1531]   Temperature Heart Rate Respirations BP   36.1 °C (97 °F) 77 17 110/67      Pulse Ox Temp src Heart Rate Source Patient Position   100 % -- -- --      BP Location FiO2 (%)     -- --       Physical Exam  Vitals and nursing note reviewed.   Constitutional:       Appearance: Normal appearance.   HENT:      Head: Normocephalic and atraumatic.   Cardiovascular:      Rate and Rhythm: Normal rate and regular rhythm.      Pulses: Normal pulses.      Heart sounds: Normal heart sounds.   Pulmonary:      Effort: Pulmonary effort is normal.      Breath sounds: Normal breath sounds.   Abdominal:      General: Abdomen is flat.      Palpations: Abdomen is soft.   Skin:     General: Skin is warm and dry.      Capillary Refill: Capillary refill takes less than 2 seconds.      Comments: Patient has a 1 cm circular wound to the medial aspect of the left foot.  There is surrounding tissue does have some erythema.  No streaking noted.  Patient has strong left pedal pulse.  MSPs intact distally.    Neurological:      General: No focal deficit present.      Mental Status: She is oriented to person, place, and time.   Psychiatric:         Mood and Affect: Mood normal.         Behavior: Behavior normal.           ED Course & MDM   Diagnoses as of 12/24/24 1730   Abrasion of left foot, initial encounter                 No data recorded     Doni Coma Scale Score: 15 (12/24/24 1531 : Graciela Sylvester RN)                           Medical Decision Making    Patient seen examined emergency department; patient is healthy nontoxic appearance not appear in acute distress.  Patient's respirations are even unlabored, skin is warm dry no diaphoresis noted.  Patient is neurologically intact without any focal deficits.  Patient has a 1 cm circular wound to the medial aspect of the left foot.  The surrounding tissue does have some erythema the wound is not leaking any malodorous drainage.  No streaking noted.  Patient has strong left pedal pulse.  MSPs intact distally.    Will obtain basic labs and imaging of the left foot to evaluate for osseous abnormalities.    CMP is significant for hypoglycemia with a glucose of 53.  Otherwise labs are unremarkable.  Imaging of left foot is without any osseous abnormalities.    Patient fed by bedside nurse.  Patient updated on results.  Patient will be discharged home with a prescription for Keflex.  Patient educated on the use this medication.  Patient given strict return parameters which she verbalized understanding of.  All patient's questions and concerns were addressed prior to discharge.  Patient discharged home in stable condition.    Procedure  Procedures     Brit Gil, RANDI-ROSALIA  12/24/24 1732

## 2025-01-04 ENCOUNTER — HOSPITAL ENCOUNTER (EMERGENCY)
Facility: HOSPITAL | Age: 25
Discharge: HOME | End: 2025-01-04
Payer: COMMERCIAL

## 2025-01-04 VITALS
SYSTOLIC BLOOD PRESSURE: 130 MMHG | BODY MASS INDEX: 18.51 KG/M2 | WEIGHT: 125 LBS | OXYGEN SATURATION: 100 % | HEART RATE: 84 BPM | RESPIRATION RATE: 17 BRPM | DIASTOLIC BLOOD PRESSURE: 70 MMHG | TEMPERATURE: 98.2 F | HEIGHT: 69 IN

## 2025-01-04 DIAGNOSIS — H57.89 IRRITATION OF LEFT EYE: Primary | ICD-10-CM

## 2025-01-04 PROCEDURE — 2500000005 HC RX 250 GENERAL PHARMACY W/O HCPCS: Performed by: PHYSICIAN ASSISTANT

## 2025-01-04 PROCEDURE — 99283 EMERGENCY DEPT VISIT LOW MDM: CPT

## 2025-01-04 RX ORDER — TETRACAINE HYDROCHLORIDE 5 MG/ML
1 SOLUTION OPHTHALMIC ONCE
Status: COMPLETED | OUTPATIENT
Start: 2025-01-04 | End: 2025-01-04

## 2025-01-04 RX ADMIN — TETRACAINE HYDROCHLORIDE 1 DROP: 5 SOLUTION OPHTHALMIC at 16:49

## 2025-01-04 RX ADMIN — FLUORESCEIN SODIUM 1 STRIP: 1 STRIP OPHTHALMIC at 16:49

## 2025-01-04 ASSESSMENT — PAIN - FUNCTIONAL ASSESSMENT: PAIN_FUNCTIONAL_ASSESSMENT: 0-10

## 2025-01-04 ASSESSMENT — LIFESTYLE VARIABLES
TOTAL SCORE: 0
HAVE PEOPLE ANNOYED YOU BY CRITICIZING YOUR DRINKING: NO
EVER FELT BAD OR GUILTY ABOUT YOUR DRINKING: NO
HAVE YOU EVER FELT YOU SHOULD CUT DOWN ON YOUR DRINKING: NO
EVER HAD A DRINK FIRST THING IN THE MORNING TO STEADY YOUR NERVES TO GET RID OF A HANGOVER: NO

## 2025-01-04 ASSESSMENT — VISUAL ACUITY
OU: 20/13
OD: 20/13
OS: 20/15

## 2025-01-04 ASSESSMENT — PAIN SCALES - GENERAL: PAINLEVEL_OUTOF10: 0 - NO PAIN

## 2025-01-04 NOTE — ED PROVIDER NOTES
HPI   Chief Complaint   Patient presents with    Eye Pain       24-year-old female, otherwise healthy presenting to the ER today with redness and irritation to her left eye that just started yesterday morning when she woke up.  Patient states that she had been sick with some sinus congestion.  She opened up an [employer] packet the night before and some of it did get onto her face and she is worried some of it may have gotten into her left eye.  She woke up the next morning with the redness and irritation.  She does wear her contact lenses for extended use stating that she has worn this pair of contacts for 1 week both during the day and sleeping in them.  She removed her contact yesterday morning but the redness and irritation persisted so she came to the ER today.  She states the eye is slightly painful, no itching.  She has had some watery discharge from the eye but no other discharge.  She is not having any visual changes.  She states that they eyes just sensitive to light.  She denies any headache or dizziness or fevers.  No further complaints at this time.      History provided by:  Patient          Patient History   Past Medical History:   Diagnosis Date    Chlamydia 12/2023     Past Surgical History:   Procedure Laterality Date    NO PAST SURGERIES       No family history on file.  Social History     Tobacco Use    Smoking status: Never    Smokeless tobacco: Never   Substance Use Topics    Alcohol use: Not on file    Drug use: Not on file       Physical Exam   ED Triage Vitals [01/04/25 1602]   Temperature Heart Rate Respirations BP   36.8 °C (98.2 °F) 84 17 130/70      Pulse Ox Temp Source Heart Rate Source Patient Position   100 % Temporal Monitor Sitting      BP Location FiO2 (%)     Right arm --       Physical Exam  Constitutional:       General: She is not in acute distress.  HENT:      Nose: Nose normal.      Mouth/Throat:      Mouth: Mucous membranes are moist.      Pharynx: Oropharynx is clear.    Eyes:      Extraocular Movements: Extraocular movements intact.      Pupils: Pupils are equal, round, and reactive to light.      Comments: No pain in extraocular movement.  No nystagmus.  No periorbital edema, erythema, ecchymosis or proptosis.  No subconjunctival hemorrhage.  Left conjunctiva is mildly injected/erythematous.  Upper and lower lids everted without evidence of foreign body.  No obvious corneal foreign body.   Musculoskeletal:      Comments: Normal gait and strength tone, no facial bony deformity or facial swelling.   Skin:     General: Skin is warm.   Neurological:      Mental Status: She is alert.      Comments: Speech normal           ED Course & MDM   Diagnoses as of 01/04/25 7239   Irritation of left eye                 No data recorded                                 Medical Decision Making  24-year-old female, otherwise healthy presenting to the ER today with redness and irritation to her left eye that started yesterday morning when she woke up.  She does wear her contacts both day and night and had this pair in for 1 week.  There was no direct trauma or injury to the eye but she also states that when she was opening an [employer] packet the night before she felt like some of that may have gotten into her left eye.  She denies any visual changes or any other acute complaints and arrives afebrile with stable vital signs.  She is not having any current pain.  On my exam there is no facial signs of trauma.  No periorbital edema or erythema or ecchymosis and there is no proptosis.  No sign of subconjunctival hemorrhage or foreign body.  Upper and lower lids were everted without evidence of foreign body.  Left eye conjunctiva is mildly injected.  There is no pain in extraocular movements and extract movements are intact.  Pupils are equal round reactive to light accommodation bilaterally.  Her exam is otherwise within normal limits.  Visual acuity will be checked as well as pressure of the eye,  pH and stained with fluorescein for ulceration versus abrasion.    The left eye was instilled with tetracaine and fluorescein.  There is no evidence of abrasion, no corneal ulceration and negative Robin sign.  Fluorescein stain exam was within normal limits.  I checked 3 consecutive pressures in her left eye which were 14, 17 and 15.  There is no sign of foreign body in pH is normal today. Visual acuity was checked with patients glasses even though she was denying any visual change.  On repeat assessment patient is resting comfortably.  She is no longer sensitive to the light and she has not had any pain in her eye while she has been in the ER.  The conjunctiva is mildly injected which could be from irritation from her contact lenses or from the [employer] packet that got into her eye.  There is no sign of any Robin sign, negative abrasion or ulceration.  Pressures today are not consistent with glaucoma.  Patient will be discharged home and she does have an eye doctor that she follows with so I discussed she should call them for close appointment but I also discussed warning signs return to the ED and she expressed understanding and agreed with the plan of care today.        Procedure  Procedures     Sophia Guardado PA-C  01/04/25 0432

## 2025-01-28 ENCOUNTER — APPOINTMENT (OUTPATIENT)
Dept: OBSTETRICS AND GYNECOLOGY | Facility: CLINIC | Age: 25
End: 2025-01-28
Payer: COMMERCIAL

## 2025-01-28 VITALS
WEIGHT: 131.7 LBS | SYSTOLIC BLOOD PRESSURE: 90 MMHG | HEIGHT: 69 IN | BODY MASS INDEX: 19.51 KG/M2 | DIASTOLIC BLOOD PRESSURE: 62 MMHG

## 2025-01-28 DIAGNOSIS — Z12.4 SCREENING FOR CERVICAL CANCER: ICD-10-CM

## 2025-01-28 DIAGNOSIS — R35.0 FREQUENCY OF URINATION: ICD-10-CM

## 2025-01-28 DIAGNOSIS — N89.8 VAGINAL DISCHARGE: ICD-10-CM

## 2025-01-28 DIAGNOSIS — Z01.419 ENCNTR FOR GYN EXAM (GENERAL) (ROUTINE) W/O ABN FINDINGS: Primary | ICD-10-CM

## 2025-01-28 PROCEDURE — 99395 PREV VISIT EST AGE 18-39: CPT | Performed by: ADVANCED PRACTICE MIDWIFE

## 2025-01-28 PROCEDURE — 87624 HPV HI-RISK TYP POOLED RSLT: CPT

## 2025-01-28 PROCEDURE — 87591 N.GONORRHOEAE DNA AMP PROB: CPT

## 2025-01-28 PROCEDURE — 87491 CHLMYD TRACH DNA AMP PROBE: CPT

## 2025-01-28 PROCEDURE — 3008F BODY MASS INDEX DOCD: CPT | Performed by: ADVANCED PRACTICE MIDWIFE

## 2025-01-28 PROCEDURE — 87661 TRICHOMONAS VAGINALIS AMPLIF: CPT

## 2025-01-28 RX ORDER — TERCONAZOLE 8 MG/G
1 CREAM VAGINAL NIGHTLY
Qty: 20 G | Refills: 1 | Status: SHIPPED | OUTPATIENT
Start: 2025-01-28 | End: 2025-01-31

## 2025-01-28 ASSESSMENT — PATIENT HEALTH QUESTIONNAIRE - PHQ9
2. FEELING DOWN, DEPRESSED OR HOPELESS: NOT AT ALL
SUM OF ALL RESPONSES TO PHQ9 QUESTIONS 1 AND 2: 0
1. LITTLE INTEREST OR PLEASURE IN DOING THINGS: NOT AT ALL

## 2025-01-28 NOTE — PROGRESS NOTES
"GYNECOLOGY PROGRESS NOTE        CC:  see below. Patient states she used the \"yeast cream\" and her symptoms resolved. Discharge is \"stringy\" with odor. The symptoms she has feel the same as she had the last time she was seen for discharge. She has changed her soap. No change in sexual partners. C/O urinary frequency and some cramping. No bowel issues.  Chief Complaint   Patient presents with    Annual Exam     Est pt visit.   Pap 1/24/24 LSIL  Patient states has discharge with no odor. This is the 2nd one in a month.        HPI:  Jose Harmon is here for a routine GYN examination.  No  AUB.        Contraception:  none  Pregnancy plans:  no  Gardasil:  no        ROS:  GI - no blood in BMs  URO - no hematuria  GYN - no AUB or vaginal discharge  PSYCH - mood OK        PHYSICAL EXAM:  BP 90/62 (BP Location: Left arm, Patient Position: Sitting, BP Cuff Size: Adult)   Ht 1.753 m (5' 9\")   Wt 59.7 kg (131 lb 11.2 oz)   LMP 01/11/2025   BMI 19.45 kg/m²   GEN:  A&O, NAD  URO:  normal urethra, no bladder TTP  GYN:  normal vulva and perineum w/o lesions or ulcers, normal vagina with white thin discharge, normal cervix without lesions or discharge or CMT, uterus NT/NE, adnexa mobile and NT/NE  DERM:  no hirsutism or acne   PSYCH:  normal affect, non-anxious      IMPRESSION/PLAN:  A: LGSIL pap 2024. Needs pap today. Scant thin white discharge noted. Urinary frequency.  Plan; 1. Pap done today. 2. STI sent off the pap. 3. Vaginal cx sent. 4. Rx for terazol 3 sent in for her, it helped with the same symptoms as before. 5. Urine cx sent.   Problem List Items Addressed This Visit    None  Visit Diagnoses       Screening for cervical cancer                 ASCCP pap smear screening guidelines reviewed with the patient.  Annual STD screening done per CDC guidelines if applicable (if < age 25).      RANDI Chang-DAVID      "

## 2025-01-30 LAB
BACTERIA UR CULT: NORMAL
BV SCORE VAG QL: NORMAL
C TRACH RRNA SPEC QL NAA+PROBE: NEGATIVE
N GONORRHOEA DNA SPEC QL PROBE+SIG AMP: NEGATIVE
T VAGINALIS RRNA SPEC QL NAA+PROBE: NEGATIVE
YEAST SPEC QL CULT: NORMAL

## 2025-02-05 LAB
BACTERIA UR CULT: NORMAL
BV SCORE VAG QL: NORMAL
YEAST SPEC QL CULT: NORMAL

## 2025-02-07 LAB
CYTOLOGY CMNT CVX/VAG CYTO-IMP: NORMAL
HPV HR 12 DNA GENITAL QL NAA+PROBE: POSITIVE
HPV HR GENOTYPES PNL CVX NAA+PROBE: POSITIVE
HPV16 DNA SPEC QL NAA+PROBE: NEGATIVE
HPV18 DNA SPEC QL NAA+PROBE: NEGATIVE
LAB AP HPV GENOTYPE QUESTION: YES
LAB AP HPV HR: NORMAL
LAB AP PAP ADDITIONAL TESTS: NORMAL
LABORATORY COMMENT REPORT: NORMAL
PATH REPORT.TOTAL CANCER: NORMAL

## 2025-02-10 ENCOUNTER — TELEPHONE (OUTPATIENT)
Dept: OBSTETRICS AND GYNECOLOGY | Facility: CLINIC | Age: 25
End: 2025-02-10
Payer: COMMERCIAL

## 2025-02-10 NOTE — TELEPHONE ENCOUNTER
----- Message from Susan Lan sent at 2/10/2025 12:41 PM EST -----  Regarding: needs colpo  Hello  Can you call this patient. Her pap this year was the same a last year. LGSIL. +HPV other.  recommended a colpo if this years pap was still abnormal. She needs a colpo scheduled and explained. Thanks.   Susan  Call placed to PT. Name and  verified. Discussed abnormal pap test results with Pt. Discussed HPV, and answered Pt's questions. Pt scheduled for colposcopy with Dr. Thakur 3/17/25 @ 3:00 pm. Pt is interested in gardisil will inquire at appointment.

## 2025-02-13 ENCOUNTER — TELEPHONE (OUTPATIENT)
Dept: OBSTETRICS AND GYNECOLOGY | Facility: CLINIC | Age: 25
End: 2025-02-13
Payer: COMMERCIAL

## 2025-03-05 ENCOUNTER — PROCEDURE VISIT (OUTPATIENT)
Dept: OBSTETRICS AND GYNECOLOGY | Facility: CLINIC | Age: 25
End: 2025-03-05
Payer: COMMERCIAL

## 2025-03-05 VITALS
WEIGHT: 130.4 LBS | BODY MASS INDEX: 19.31 KG/M2 | HEIGHT: 69 IN | SYSTOLIC BLOOD PRESSURE: 110 MMHG | DIASTOLIC BLOOD PRESSURE: 72 MMHG

## 2025-03-05 DIAGNOSIS — R87.810 CERVICAL HIGH RISK HPV (HUMAN PAPILLOMAVIRUS) TEST POSITIVE: ICD-10-CM

## 2025-03-05 DIAGNOSIS — R87.612 LGSIL ON PAP SMEAR OF CERVIX: ICD-10-CM

## 2025-03-05 DIAGNOSIS — Z23 NEED FOR HPV VACCINE: ICD-10-CM

## 2025-03-05 PROBLEM — N93.9 ABNORMAL UTERINE BLEEDING (AUB): Status: RESOLVED | Noted: 2024-01-25 | Resolved: 2025-03-05

## 2025-03-05 LAB — PREGNANCY TEST URINE, POC: NEGATIVE

## 2025-03-05 PROCEDURE — 90651 9VHPV VACCINE 2/3 DOSE IM: CPT | Performed by: OBSTETRICS & GYNECOLOGY

## 2025-03-05 PROCEDURE — 57454 BX/CURETT OF CERVIX W/SCOPE: CPT | Performed by: OBSTETRICS & GYNECOLOGY

## 2025-03-05 PROCEDURE — 99214 OFFICE O/P EST MOD 30 MIN: CPT | Performed by: OBSTETRICS & GYNECOLOGY

## 2025-03-05 PROCEDURE — 88305 TISSUE EXAM BY PATHOLOGIST: CPT

## 2025-03-05 PROCEDURE — 90471 IMMUNIZATION ADMIN: CPT | Performed by: OBSTETRICS & GYNECOLOGY

## 2025-03-05 PROCEDURE — 81025 URINE PREGNANCY TEST: CPT | Performed by: OBSTETRICS & GYNECOLOGY

## 2025-03-05 PROCEDURE — 88305 TISSUE EXAM BY PATHOLOGIST: CPT | Performed by: STUDENT IN AN ORGANIZED HEALTH CARE EDUCATION/TRAINING PROGRAM

## 2025-03-05 RX ORDER — AMOXICILLIN 500 MG/1
500 CAPSULE ORAL EVERY 8 HOURS
COMMUNITY
Start: 2025-02-27

## 2025-03-05 NOTE — PROGRESS NOTES
GYNECOLOGY PROGRESS NOTE          CC:     Chief Complaint   Patient presents with    Procedure     Colpo procedure  Gardasil vaccine  Preg test neg  Pap 1/2025 LGSIL w/HPV pos.  Chaperone harsh laguna ma          HPI:  Jose Harmon is scheduled today for COLPO for abnormal Pap smear.      Pap results:  LGSIL,/+ other HR HPV .  Last pap LGSIL in 2024 and ASCUS/+ other HR HPV in 2023.   No new GYN complaints.  No AUB or vaginal discharge.    Gardasil:  no--starting today  Smoker:   no      ROS:  GYN - see HPI      HISTORY:  Past Surgical History:   Procedure Laterality Date    NO PAST SURGERIES       Social History     Tobacco Use    Smoking status: Never    Smokeless tobacco: Never           PHYSICAL EXAM:  GEN:  A&O, NAD  URO:  normal urethral meatus  GYN:  normal vulva and perineum w/o lesions or ulcers, normal vagina without discharge or lesions, normal cervix without lesions or discharge--SEE COLPO map  PSYCH:  normal affect, non-anxious      IMPRESSION/PLAN:  Problem List Items Addressed This Visit    None  Visit Diagnoses       LGSIL on Pap smear of cervix        Relevant Orders    POCT pregnancy, urine manually resulted    Cervical high risk HPV (human papillomavirus) test positive        Relevant Orders    POCT pregnancy, urine manually resulted    Need for HPV vaccine        Relevant Orders    HPV 9-valent vaccine (GARDASIL 9)           Abnormal pap smear:  Pap=LGSIL/+ other HR HPV.  Counseling done on abnormal paps and COLPO.  Natural course of HPV and high rates of regression of LGSIL reviewed with patient.  Need for treatment if HGSIL discussed.  COLPO done today, patient tolerated well.  GOOD candidate for office LEEP.  Risk of HGSIL/need for treatment per ASCCP alyson calculator=5.0%.  Smoking:  no.  Gardasil:  no--starting series today.    Gardasil vaccination:  Gardasil vaccine counseling done and vaccination highly recommended.  Patient has not previously been vaccinated.  + Insurance benefits.   Use and side effects reviewed. Indications for prevention of genital warts, cervical dysplasia, and cervical cancer reviewed.  Prevalence of HPV in the [place] reviewed.  Dose #1 given today, F/U in 2 months for dose #2, then in 6 months for 3rd and final dose.      F/U in 2-3 weeks for virtual visit for COLPO results.  F/U in 2 months for 2nd Gardasil dose.      Tyrone Thakur DO

## 2025-03-05 NOTE — PROGRESS NOTES
Patient ID: Jose Harmon is a 24 y.o. female here for COLPO for abnormal pap smear.    Procedures    COLPOSCOPY PROCEDURE NOTE    Patient was consented, risks of procedure reviewed.  Patient was placed in lithotomy position and a speculum was placed.  The cervix was fully visualized and was cleansed with saline.  Visual inspection was performed and the transformation zone was fully visualized.  Acetic acid was placed on the cervix and any aceto-white changes were noted, no atypical lesions seen.  Lugol's iodine solution was then placed on the cervix and any non-staining areas were identified (see cervix diagram).  Cervical biopsies were performed at 10/2/5 o'clock,.  An ECC was collected using a Zazumian device and the cytology was  collected with a Cytobrush.   Silver nitrate was applied to the biopsy sites as needed for hemostasis.  The patient tolerated the procedure well.  EBL was minimal and there were no complications.      BLUE=TZ  GREEN=partial negative iodine uptake  RED=biopsy sites        Tyrone Thakur DO

## 2025-03-12 LAB
LABORATORY COMMENT REPORT: NORMAL
PATH REPORT.FINAL DX SPEC: NORMAL
PATH REPORT.GROSS SPEC: NORMAL
PATH REPORT.RELEVANT HX SPEC: NORMAL
PATH REPORT.TOTAL CANCER: NORMAL

## 2025-03-17 ENCOUNTER — APPOINTMENT (OUTPATIENT)
Dept: OBSTETRICS AND GYNECOLOGY | Facility: CLINIC | Age: 25
End: 2025-03-17
Payer: COMMERCIAL

## 2025-03-19 ENCOUNTER — APPOINTMENT (OUTPATIENT)
Dept: OBSTETRICS AND GYNECOLOGY | Facility: CLINIC | Age: 25
End: 2025-03-19
Payer: COMMERCIAL

## 2025-03-19 DIAGNOSIS — R87.612 LGSIL ON PAP SMEAR OF CERVIX: Primary | ICD-10-CM

## 2025-03-19 DIAGNOSIS — R87.810 CERVICAL HIGH RISK HPV (HUMAN PAPILLOMAVIRUS) TEST POSITIVE: ICD-10-CM

## 2025-03-19 PROCEDURE — 99214 OFFICE O/P EST MOD 30 MIN: CPT | Performed by: OBSTETRICS & GYNECOLOGY

## 2025-03-19 NOTE — PROGRESS NOTES
GYNECOLOGY VIRTUAL VISIT PROGRESS NOTE          CC:     Chief Complaint   Patient presents with    Results     COLPO results        HPI:  Jose Harmon is scheduled today for virtual visit to discuss COLPO  test results.   Audio and Visual communication real-time were utilized and verbal consent was obtained for the encounter.    Did OK after COLPO.  No new GYN complaints.        ROS:  GYN - see HPI      PHYSICAL EXAM:  VS:  n/a (virtual visit)  GEN:  A&O, NAD  PSYCH:  normal affect, non-anxious      PATH RESULTS:  1/23/23 - pap=ASCUS, + other HR HPV  1/24/24 - pap=LGSIL, + other HR HPV  1/28/25 - pap=LGSIL, + other HR HPV  3/5/25 - COLPO  A.  Endocervix, curettage:  --No diagnostic tissue identified (specimen did not survive processing)     B.  Cervix, 5:00, biopsy:  --Transformation zone mucosa with chronic inflammation     C.  Cervix, 2:00, biopsy:  --Squamous mucosa with no significant histopathologic abnormalities  --No transformation zone mucosa seen     D.  Cervix, 10:00, biopsy:  --Transformation zone mucosa with mild acute and chronic inflammation  IMPRESSION/PLAN:  Problem List Items Addressed This Visit    None  Visit Diagnoses       LGSIL on Pap smear of cervix    -  Primary    Cervical high risk HPV (human papillomavirus) test positive              Abnormal pap:   Pap results=LGSIL, + other HR HPV.  2024 and 2023 pap results also reviewed.  COLPO results=normal--results reviewed with the patient.  Per ASCCP guidelines the surveillance plan is repeat Pap and HPV testing in 12 months.  Smoking cessation as risk factor modification:   non-smoker.  Gardasil vaccination;:  started at COLPO, has nurse appointment in 2 months for second dose, third and final dose will be due in September.       F/U in 1 year with DAVID [place] for annual examination with repeat Pap/HPV testing.        Tyrone Thakur DO

## 2025-05-01 ENCOUNTER — OFFICE VISIT (OUTPATIENT)
Dept: OBSTETRICS AND GYNECOLOGY | Facility: CLINIC | Age: 25
End: 2025-05-01
Payer: COMMERCIAL

## 2025-05-01 VITALS
HEIGHT: 69 IN | SYSTOLIC BLOOD PRESSURE: 100 MMHG | DIASTOLIC BLOOD PRESSURE: 70 MMHG | BODY MASS INDEX: 19.34 KG/M2 | WEIGHT: 130.6 LBS

## 2025-05-01 DIAGNOSIS — Z20.2 POSSIBLE EXPOSURE TO STD: Primary | ICD-10-CM

## 2025-05-01 DIAGNOSIS — N89.8 VAGINAL DISCHARGE: ICD-10-CM

## 2025-05-01 PROCEDURE — 1036F TOBACCO NON-USER: CPT | Performed by: ADVANCED PRACTICE MIDWIFE

## 2025-05-01 PROCEDURE — 99212 OFFICE O/P EST SF 10 MIN: CPT | Performed by: ADVANCED PRACTICE MIDWIFE

## 2025-05-01 PROCEDURE — 3008F BODY MASS INDEX DOCD: CPT | Performed by: ADVANCED PRACTICE MIDWIFE

## 2025-05-01 NOTE — PROGRESS NOTES
"GYNECOLOGY PROGRESS NOTE        CC:  Patient would like STI sent and vaginal culture. She has a new sexual partner but no real odor, itching or discharge. No UTI symptoms.   Chief Complaint   Patient presents with    Follow-up     Patient would like a culture for STD   NO UTI symptoms        HPI:  Jose Harmon is her with a complaints of a new sexual partner.   She denies any recent antibiotics, or new soaps or detergents. She has a new sexual partner., She has had recurrent vaginitis yeast infections but feels she does not have symptoms at this time.      ROS:  GYN - see HPI        PHYSICAL EXAM:  /70 (BP Location: Left arm, Patient Position: Sitting)   Ht 1.753 m (5' 9\")   Wt 59.2 kg (130 lb 9.6 oz)   LMP 04/25/2025   BMI 19.29 kg/m²   GEN:  A&O, NAD  URO:  normal urethra, bladder NT  GYN:  normal vulva and perineum w/o lesions or ulcers, scant white discharge noted.  PSYCH:  normal affect, non-anxious      IMPRESSION/PLAN:  A: scant white discharge. Possible STD exposure  Plan: 1. STI off the urine sent. 2. Vaginal cx. Patient would like a cream/gel if needed for treatment.   Problem List Items Addressed This Visit    None       STD cultures done.  Vulvar hygiene measures and condom use for STD prevention reviewed.       RANDI Chang-DAVID  "

## 2025-05-02 LAB
BV SCORE VAG QL: NORMAL
C TRACH RRNA SPEC QL NAA+PROBE: NOT DETECTED
N GONORRHOEA RRNA SPEC QL NAA+PROBE: NOT DETECTED
QUEST GC CT AMPLIFIED (ALWAYS MESSAGE): NORMAL
T VAGINALIS RRNA SPEC QL NAA+PROBE: NOT DETECTED

## 2025-05-05 ENCOUNTER — APPOINTMENT (OUTPATIENT)
Dept: OBSTETRICS AND GYNECOLOGY | Facility: CLINIC | Age: 25
End: 2025-05-05
Payer: COMMERCIAL

## 2025-05-05 VITALS
HEIGHT: 69 IN | BODY MASS INDEX: 19.86 KG/M2 | SYSTOLIC BLOOD PRESSURE: 110 MMHG | WEIGHT: 134.1 LBS | DIASTOLIC BLOOD PRESSURE: 70 MMHG

## 2025-05-05 DIAGNOSIS — Z23 NEED FOR HPV VACCINE: ICD-10-CM

## 2025-05-05 LAB — PREGNANCY TEST URINE, POC: NEGATIVE

## 2025-05-05 PROCEDURE — 81025 URINE PREGNANCY TEST: CPT | Performed by: OBSTETRICS & GYNECOLOGY

## 2025-05-05 PROCEDURE — 90651 9VHPV VACCINE 2/3 DOSE IM: CPT | Performed by: OBSTETRICS & GYNECOLOGY

## 2025-05-05 PROCEDURE — 90471 IMMUNIZATION ADMIN: CPT | Performed by: OBSTETRICS & GYNECOLOGY

## 2025-05-05 NOTE — PROGRESS NOTES
Gardasil #2 given today  Given in the left deltoid  #3 to be given in 4 months from today  Used office supply

## 2025-06-02 DIAGNOSIS — Z30.011 ENCOUNTER FOR INITIAL PRESCRIPTION OF CONTRACEPTIVE PILLS: Primary | ICD-10-CM

## 2025-06-02 RX ORDER — LEVONORGESTREL 1.5 MG/1
1.5 TABLET ORAL ONCE
Qty: 1 TABLET | Refills: 0 | Status: SHIPPED | OUTPATIENT
Start: 2025-06-02 | End: 2025-06-02

## 2025-07-31 DIAGNOSIS — N89.8 VAGINAL DISCHARGE: Primary | ICD-10-CM

## 2025-07-31 RX ORDER — FLUCONAZOLE 150 MG/1
TABLET ORAL
Qty: 2 TABLET | Refills: 1 | Status: SHIPPED | OUTPATIENT
Start: 2025-07-31